# Patient Record
(demographics unavailable — no encounter records)

---

## 2024-11-12 NOTE — HISTORY OF PRESENT ILLNESS
[FreeTextEntry1] : Patient presents back to the office today feeling reasonably well and proud of the fact that he is completely sober.  He is stopped smoking cigarettes, stop drinking alcohol and stop using marijuana.  With that he has gained 20 pounds of which he is not so proud.  Blood pressures have continued to be in the 100s over 60s but are little bit higher since he gained the weight.  He notes that he still has very poor sleep but has been able to get a sleep study.  He did have an episode of palpitations after he was switched from Vascepa to fenofibrate back in August but was never able to complete the event monitor that I ordered.  He has not had any such palpitations since.  He still reports an occasional skipped beat but nothing more significant than that.  He does not report any other physical symptoms at this time.  Patient denies chest pain, shortness of breath, orthopnea, presyncope, syncope.

## 2024-11-12 NOTE — DISCUSSION/SUMMARY
[FreeTextEntry1] : 1.  Patient should have sleep study to evaluate his daytime somnolence and poor sleep in the setting of CAD but there were insurance issues.  We will attempt to get him a home study. 2.  Continue over-the-counter fish oil but he will take 1 pill twice a day.  Blood work prior to follow-up. 3.  Decrease hydralazine to 25 mg twice daily.  If his blood pressures do not come up he will let me know and I will discontinue it altogether. 4.  Continue other current cardiac meds in doses as noted above including atorvastatin 40 mg daily for CAD, hypertension, dyslipidemia. 5.  Monitor BP at home, keep a log and bring to f/u. 6.  Encourage patient to continue to avoid all of his toxic habits. 7.  Patient encouraged to work on diet to lose weight. 8.  Patient encouraged to work on a healthy diet high in lean protein, whole grains and vegetables, and lower in white flour and simple sugars. 9.  Patient is encouraged to exercise at least 30 minutes a day every day of the week. 10.  Follow up here in 4 months. [EKG obtained to assist in diagnosis and management of assessed problem(s)] : EKG obtained to assist in diagnosis and management of assessed problem(s)

## 2024-11-12 NOTE — ASSESSMENT
[FreeTextEntry1] : Echocardiogram September 14, 2023 demonstrated left ventricle normal size and function with ejection fraction of 60 to 65%. Trace mitral and mild tricuspid regurgitation noted. Borderline dilatation of the ascending aorta to 4.0 cm.  Exercise stress test September 12, 2023 during which the patient exercised via a Santiago protocol for 9 minutes and 12 seconds, totaling 10 METS. Peak heart rate achieved was 137 bpm, representing 77% of age predicted maximum. EKG with exertion showed borderline horizontal ST depressions in the inferior anterolateral leads. Stress test was ultimately equivocal but submaximal for ischemia.  Holter monitor August 22 August 29, 2023 demonstrated presenting rhythm of sinus with an average heart rate of 84 bpm, maximum 131, minimum 59 bpm. A single PAT of 4 beats was noted. Rare PACs and PVCs otherwise. No other significant arrhythmia.  Coronary CTA November 20, 2023 demonstrated a calcium score of 387.  Mild stenosis noted in the LAD and diagonal with a short segment of mid LAD bridging.  Coarse calcification in the proximal RCA Limited assessment but there appears to be at least moderate stenosis.  EKG: Sinus rhythm with no significant ST or T wave changes.  42-year-old man with a past medical history of CAD, hypertension, dyslipidemia who presents today for follow-up.  Patient presents back doing well.  He had an episode of palpitations back in August after he started fenofibrate and has since been taken off it.  He never got the event monitor and I do not believe it would be helpful to do that now as he is completely asymptomatic with the exception of very rare extrasystolic beating.  Blood pressure continues to be on the low side even with his weight gain.  I have applauded his efforts to quit smoking, drinking and marijuana.  I will pull back on hydralazine to 25 mg twice daily.  If his blood pressures do not come up or he has any symptoms I will discontinue it altogether.  With regards to his lipids he is taking over-the-counter fish oil and I have told him to take it twice a day and we will repeat blood work at follow-up.

## 2025-02-13 NOTE — HISTORY OF PRESENT ILLNESS
[FreeTextEntry1] : Follow-up [de-identified] : Left shoulder injury 2 weeks ago ortho referral letter for light duty work.  Follow-up labs today

## 2025-03-18 NOTE — ASSESSMENT
[FreeTextEntry1] : Echocardiogram September 14, 2023 demonstrated left ventricle normal size and function with ejection fraction of 60 to 65%. Trace mitral and mild tricuspid regurgitation noted. Borderline dilatation of the ascending aorta to 4.0 cm.  Exercise stress test September 12, 2023 during which the patient exercised via a Santiago protocol for 9 minutes and 12 seconds, totaling 10 METS. Peak heart rate achieved was 137 bpm, representing 77% of age predicted maximum. EKG with exertion showed borderline horizontal ST depressions in the inferior anterolateral leads. Stress test was ultimately equivocal but submaximal for ischemia.  Holter monitor August 22 August 29, 2023 demonstrated presenting rhythm of sinus with an average heart rate of 84 bpm, maximum 131, minimum 59 bpm. A single PAT of 4 beats was noted. Rare PACs and PVCs otherwise. No other significant arrhythmia.  Coronary CTA November 20, 2023 demonstrated a calcium score of 387.  Mild stenosis noted in the LAD and diagonal with a short segment of mid LAD bridging.  Coarse calcification in the proximal RCA Limited assessment but there appears to be at least moderate stenosis.  EKG: Sinus rhythm with no significant ST or T wave changes. PVC.  42-year-old man with a past medical history of CAD, hypertension, dyslipidemia who presents today for follow-up.  Patient presents back having some more palpitations and anxiety in the past.  His sleep study showed severe sleep apnea and he needs to follow-up on that but also suggested possible atrial fibrillation although this may have been artifact.  I believe this could also be related to his PVCs and I think atrial fibrillation is overall unlikely.  I will do an event monitor to evaluate that as well as the PVC burden.  He will also have an echocardiogram done.  EKG is unremarkable other than the PVC that was noted today.  If his PVCs are a significant issue for him and not excessive in burden we could consider still ablation or changing his beta-blocker but his blood pressures been well-controlled and I would like to avoid that if possible.  He says that if I tell him it is benign and not concerning he probably can just learn to adjust to it.  Blood pressure appears to be well-controlled.  Blood work does show a bump in his LDL.  He will make efforts with diet and exercise and we will reconsider his statin dosing at follow-up.  Blood work is otherwise unremarkable.  A1c is also elevated at 6.1 which represents a significant increase.

## 2025-03-18 NOTE — DISCUSSION/SUMMARY
[FreeTextEntry1] : 1.  2-week event monitor and echocardiogram to further evaluate his PVC burden and palpitations. 2.  Continue current cardiac meds in doses as noted above including atorvastatin 40 mg daily for CAD, hypertension, dyslipidemia. 3.  Monitor BP at home, keep a log and bring to f/u. 4.  Reconsider statin dosing on the basis of his blood work prior to follow-up. 5.  Encourage patient to continue to avoid all of his toxic habits. 6.  Patient encouraged to work on diet to lose weight. 7.  Patient encouraged to work on a healthy diet high in lean protein, whole grains and vegetables, and lower in white flour and simple sugars. 8.  Patient is encouraged to exercise at least 30 minutes a day every day of the week. 9.  Follow up here in 3 months. [EKG obtained to assist in diagnosis and management of assessed problem(s)] : EKG obtained to assist in diagnosis and management of assessed problem(s)

## 2025-03-18 NOTE — HISTORY OF PRESENT ILLNESS
[FreeTextEntry1] : Patient presents back to the office today noting some more palpitations over the last few days.  He says he feels that intermittently throughout the day and sometimes it stops him because he notes it.  He does not report any dizziness or lightheadedness or any other symptoms with it.  He says he has felt that at least 40 times this morning before coming here.  He otherwise feels physically well.  He continues to be active without any symptoms or limitations.  He says blood pressure at home have been in the 110s over 60s.  Patient denies chest pain, shortness of breath, orthopnea, presyncope, syncope.